# Patient Record
Sex: FEMALE | Race: BLACK OR AFRICAN AMERICAN | Employment: FULL TIME | URBAN - METROPOLITAN AREA
[De-identification: names, ages, dates, MRNs, and addresses within clinical notes are randomized per-mention and may not be internally consistent; named-entity substitution may affect disease eponyms.]

---

## 2023-01-13 ENCOUNTER — TELEPHONE (OUTPATIENT)
Dept: INTERNAL MEDICINE | Facility: CLINIC | Age: 38
End: 2023-01-13
Payer: MEDICAID

## 2023-01-13 NOTE — TELEPHONE ENCOUNTER
I spoke with the pt and informed that I apologized as she was scheduled but , at this time we are not taking any new medicaid pt's to check back in maybe a month or two to see if the freeze is lifted. She verbalized understanding. //kah

## 2023-01-13 NOTE — TELEPHONE ENCOUNTER
----- Message from Flora Paez sent at 1/13/2023  2:28 PM CST -----  States she maybe 15 to 20 minutes late for her appt. Please call pt [phone]. Thank you